# Patient Record
Sex: MALE | URBAN - METROPOLITAN AREA
[De-identification: names, ages, dates, MRNs, and addresses within clinical notes are randomized per-mention and may not be internally consistent; named-entity substitution may affect disease eponyms.]

---

## 2022-12-15 ENCOUNTER — APPOINTMENT (RX ONLY)
Dept: URBAN - METROPOLITAN AREA CLINIC 33 | Facility: CLINIC | Age: 77
Setting detail: DERMATOLOGY
End: 2022-12-15

## 2022-12-15 DIAGNOSIS — L28.0 LICHEN SIMPLEX CHRONICUS: ICD-10-CM | Status: INADEQUATELY CONTROLLED

## 2022-12-15 DIAGNOSIS — L73.9 FOLLICULAR DISORDER, UNSPECIFIED: ICD-10-CM

## 2022-12-15 DIAGNOSIS — L663 OTHER SPECIFIED DISEASES OF HAIR AND HAIR FOLLICLES: ICD-10-CM

## 2022-12-15 DIAGNOSIS — L738 OTHER SPECIFIED DISEASES OF HAIR AND HAIR FOLLICLES: ICD-10-CM

## 2022-12-15 PROBLEM — L02.32 FURUNCLE OF BUTTOCK: Status: ACTIVE | Noted: 2022-12-15

## 2022-12-15 PROCEDURE — ? COUNSELING

## 2022-12-15 PROCEDURE — ? PRESCRIPTION

## 2022-12-15 PROCEDURE — 99203 OFFICE O/P NEW LOW 30 MIN: CPT

## 2022-12-15 RX ORDER — TRIAMCINOLONE ACETONIDE 1 MG/G
APPLY A THIN LAYER CREAM TOPICAL
Qty: 15 | Refills: 0 | Status: ERX | COMMUNITY
Start: 2022-12-15

## 2022-12-15 RX ADMIN — TRIAMCINOLONE ACETONIDE APPLY A THIN LAYER: 1 CREAM TOPICAL at 00:00

## 2022-12-15 ASSESSMENT — PAIN INTENSITY VAS: HOW INTENSE IS YOUR PAIN 0 BEING NO PAIN, 10 BEING THE MOST SEVERE PAIN POSSIBLE?: NO PAIN

## 2022-12-15 ASSESSMENT — LOCATION ZONE DERM
LOCATION ZONE: LEG
LOCATION ZONE: ARM
LOCATION ZONE: TRUNK

## 2022-12-15 ASSESSMENT — LOCATION SIMPLE DESCRIPTION DERM
LOCATION SIMPLE: LEFT FOREARM
LOCATION SIMPLE: RIGHT BUTTOCK
LOCATION SIMPLE: RIGHT CALF

## 2022-12-15 ASSESSMENT — SEVERITY ASSESSMENT
SEVERITY: MILD
SEVERITY: ALMOST CLEAR

## 2022-12-15 ASSESSMENT — BSA RASH: BSA RASH: 1

## 2022-12-15 ASSESSMENT — LOCATION DETAILED DESCRIPTION DERM
LOCATION DETAILED: LEFT PROXIMAL DORSAL FOREARM
LOCATION DETAILED: RIGHT DISTAL LATERAL CALF
LOCATION DETAILED: RIGHT BUTTOCK

## 2022-12-15 NOTE — PROCEDURE: COUNSELING
Detail Level: Simple
Moisturizer Recommendations: When flared you should moisturizer twice a day. This is to be applied over the topical medication. When the flare fades continue with daily moisturizing. Always moisturize after your shower or bath. Recommend CeraVe.
Patient Specific Counseling (Will Not Stick From Patient To Patient): \\nThe patient was instructed to treat once a day with the medicated cream till clear. Follow with a moisturizer. When patches clear continue moisturizer on a daily basis. If patches return start treatment again. Most patches should clear in 2 weeks. If not clear in 2 weeks stop treatment and return for exam.
Cleanser Recommendations: The patient will shower daily and wash with dial soap
Patient Specific Counseling (Will Not Stick From Patient To Patient): \\nThere is only one lesion on the buttocks. I suggested he continue with the Dial soap. Wear loose fitting clothing. If it progresses we can add in Clindamycin lotion.
Topical Antifungals Recommendations: The patient will wash with ketoconazole shampoo 3 times a week until the eruption resolved.
Topical Antibiotics Recommendations: The patient will apply clindamycin lotion daily to the areas involved until resolved.
Chlorhexidine Recommendations: This is to be used daily for the first week then once a week to keep the bacterial count down.
Bpo Recommendations: I recommend Panoxyl soap to be use in the shower on a daily basis.